# Patient Record
Sex: MALE | Race: BLACK OR AFRICAN AMERICAN | Employment: UNEMPLOYED | URBAN - METROPOLITAN AREA
[De-identification: names, ages, dates, MRNs, and addresses within clinical notes are randomized per-mention and may not be internally consistent; named-entity substitution may affect disease eponyms.]

---

## 2021-10-21 ENCOUNTER — APPOINTMENT (OUTPATIENT)
Dept: GENERAL RADIOLOGY | Age: 41
DRG: 138 | End: 2021-10-21
Attending: FAMILY MEDICINE
Payer: MEDICAID

## 2021-10-21 ENCOUNTER — HOSPITAL ENCOUNTER (INPATIENT)
Age: 41
LOS: 1 days | Discharge: HOME | DRG: 138 | End: 2021-10-21
Attending: FAMILY MEDICINE | Admitting: FAMILY MEDICINE
Payer: MEDICAID

## 2021-10-21 VITALS
HEIGHT: 70 IN | RESPIRATION RATE: 20 BRPM | SYSTOLIC BLOOD PRESSURE: 155 MMHG | DIASTOLIC BLOOD PRESSURE: 98 MMHG | HEART RATE: 102 BPM | OXYGEN SATURATION: 91 % | WEIGHT: 235 LBS | TEMPERATURE: 97.4 F | BODY MASS INDEX: 33.64 KG/M2

## 2021-10-21 PROBLEM — J96.91 RESPIRATORY FAILURE WITH HYPOXIA (HCC): Status: ACTIVE | Noted: 2021-10-21

## 2021-10-21 PROBLEM — J18.9 PNEUMONIA: Status: ACTIVE | Noted: 2021-10-21

## 2021-10-21 LAB
ANION GAP SERPL CALC-SCNC: 7 MMOL/L (ref 5–15)
B PERT DNA SPEC QL NAA+PROBE: NOT DETECTED
BORDETELLA PARAPERTUSSIS PCR, BORPAR: NOT DETECTED
BUN SERPL-MCNC: 22 MG/DL (ref 6–20)
BUN/CREAT SERPL: 19 (ref 12–20)
C PNEUM DNA SPEC QL NAA+PROBE: NOT DETECTED
CALCIUM SERPL-MCNC: 9.5 MG/DL (ref 8.5–10.1)
CHLORIDE SERPL-SCNC: 105 MMOL/L (ref 97–108)
CO2 SERPL-SCNC: 22 MMOL/L (ref 21–32)
CREAT SERPL-MCNC: 1.18 MG/DL (ref 0.7–1.3)
FLUAV H1 2009 PAND RNA SPEC QL NAA+PROBE: NOT DETECTED
FLUAV H1 RNA SPEC QL NAA+PROBE: NOT DETECTED
FLUAV H3 RNA SPEC QL NAA+PROBE: NOT DETECTED
FLUAV SUBTYP SPEC NAA+PROBE: NOT DETECTED
FLUBV RNA SPEC QL NAA+PROBE: NOT DETECTED
GLUCOSE SERPL-MCNC: 123 MG/DL (ref 65–100)
HADV DNA SPEC QL NAA+PROBE: NOT DETECTED
HCOV 229E RNA SPEC QL NAA+PROBE: NOT DETECTED
HCOV HKU1 RNA SPEC QL NAA+PROBE: NOT DETECTED
HCOV NL63 RNA SPEC QL NAA+PROBE: NOT DETECTED
HCOV OC43 RNA SPEC QL NAA+PROBE: NOT DETECTED
HMPV RNA SPEC QL NAA+PROBE: NOT DETECTED
HPIV1 RNA SPEC QL NAA+PROBE: NOT DETECTED
HPIV2 RNA SPEC QL NAA+PROBE: NOT DETECTED
HPIV3 RNA SPEC QL NAA+PROBE: NOT DETECTED
HPIV4 RNA SPEC QL NAA+PROBE: NOT DETECTED
M PNEUMO DNA SPEC QL NAA+PROBE: NOT DETECTED
POTASSIUM SERPL-SCNC: 4.3 MMOL/L (ref 3.5–5.1)
RSV RNA SPEC QL NAA+PROBE: DETECTED
RV+EV RNA SPEC QL NAA+PROBE: DETECTED
SARS-COV-2 PCR, COVPCR: NOT DETECTED
SODIUM SERPL-SCNC: 134 MMOL/L (ref 136–145)

## 2021-10-21 PROCEDURE — 80048 BASIC METABOLIC PNL TOTAL CA: CPT

## 2021-10-21 PROCEDURE — 87536 HIV-1 QUANT&REVRSE TRNSCRPJ: CPT

## 2021-10-21 PROCEDURE — 65270000029 HC RM PRIVATE

## 2021-10-21 PROCEDURE — 86361 T CELL ABSOLUTE COUNT: CPT

## 2021-10-21 PROCEDURE — 99255 IP/OBS CONSLTJ NEW/EST HI 80: CPT | Performed by: INTERNAL MEDICINE

## 2021-10-21 PROCEDURE — 71046 X-RAY EXAM CHEST 2 VIEWS: CPT

## 2021-10-21 PROCEDURE — 36415 COLL VENOUS BLD VENIPUNCTURE: CPT

## 2021-10-21 PROCEDURE — 0202U NFCT DS 22 TRGT SARS-COV-2: CPT

## 2021-10-21 RX ORDER — ACETAMINOPHEN 325 MG/1
650 TABLET ORAL
Status: DISCONTINUED | OUTPATIENT
Start: 2021-10-21 | End: 2021-10-21 | Stop reason: HOSPADM

## 2021-10-21 RX ORDER — ONDANSETRON 4 MG/1
4 TABLET, ORALLY DISINTEGRATING ORAL
Status: DISCONTINUED | OUTPATIENT
Start: 2021-10-21 | End: 2021-10-21 | Stop reason: HOSPADM

## 2021-10-21 RX ORDER — SODIUM CHLORIDE 0.9 % (FLUSH) 0.9 %
5-40 SYRINGE (ML) INJECTION AS NEEDED
Status: DISCONTINUED | OUTPATIENT
Start: 2021-10-21 | End: 2021-10-21 | Stop reason: HOSPADM

## 2021-10-21 RX ORDER — SODIUM CHLORIDE 0.9 % (FLUSH) 0.9 %
5-40 SYRINGE (ML) INJECTION EVERY 8 HOURS
Status: DISCONTINUED | OUTPATIENT
Start: 2021-10-21 | End: 2021-10-21 | Stop reason: HOSPADM

## 2021-10-21 RX ORDER — ACETAMINOPHEN 650 MG/1
650 SUPPOSITORY RECTAL
Status: DISCONTINUED | OUTPATIENT
Start: 2021-10-21 | End: 2021-10-21 | Stop reason: HOSPADM

## 2021-10-21 RX ORDER — ENOXAPARIN SODIUM 100 MG/ML
40 INJECTION SUBCUTANEOUS DAILY
Status: DISCONTINUED | OUTPATIENT
Start: 2021-10-21 | End: 2021-10-21 | Stop reason: HOSPADM

## 2021-10-21 RX ORDER — ONDANSETRON 2 MG/ML
4 INJECTION INTRAMUSCULAR; INTRAVENOUS
Status: DISCONTINUED | OUTPATIENT
Start: 2021-10-21 | End: 2021-10-21 | Stop reason: HOSPADM

## 2021-10-21 RX ORDER — GUAIFENESIN, PSEUDOEPHEDRINE HYDROCHLORIDE 600; 60 MG/1; MG/1
1 TABLET, EXTENDED RELEASE ORAL EVERY 12 HOURS
Qty: 60 TABLET | Refills: 0 | Status: SHIPPED | OUTPATIENT
Start: 2021-10-21 | End: 2021-11-20

## 2021-10-21 NOTE — PROGRESS NOTES
Entered patient room to attempt to redraw earlier ordered blood work  Which couldn't be processed. Patient stated he desires to discharge. Called attending provider who requested pulse oximetry to be obtained. to ensure the patient is safe to discharge.  Advised provider will follow-up shortly

## 2021-10-21 NOTE — H&P
[address] [facility]  History and Physical    Primary Care Provider: None  Date of Service:  10/21/2021    Subjective:     Lakeisha Portillo is a 37 y.o. male with a pmhx HIV, not on antiretrovirals who presented to [facility] for worsening cough, and shortness of breath. He was noted to be hypoxic requiring mid flow oxygen at one point. His XR showed diffuse patchy pneumonia, and his respiratory virus panel was + for rhinovirus, and RSV. He states that he was exposed to his son with RSV recently. He received bactrim for empiric tx of PCP given unkown CD4 count. Patient states that his HIV was treated by Dr. Yomi Curiel formerly, but he was incarcerated several times, and had been lost to follow up. He last took medications one month ago while incarcerated. Review of Systems:    All other review of systems were negative except for that written in the History of Present Illness. PMH: HIV   No past surgical history on file. Prior to Admission medications    Not on File     No Known Allergies   Family hx reviewed, and not pertinent to presenting problem    SOCIAL HISTORY:  [facility]. Patient ambulates independently. Smoking history: 1PPD  Alcohol history: 2-3 25 ounce beers daily        Objective:       Physical Exam:   Visit Vitals  BP (!) 151/88 (BP 1 Location: Right upper arm, BP Patient Position: At rest)   Pulse 84   Temp 98.2 °F (36.8 °C)   Resp 16   Ht 5' 10\" (1.778 m)   Wt 106.6 kg (235 lb)   SpO2 95%   BMI 33.72 kg/m²     General:  Alert, cooperative, no distress, appears stated age. Head:  Normocephalic, without obvious abnormality, atraumatic. Eyes:  Conjunctivae/corneas clear. EOMs intact. Ears:  Normal TMs and external ear canals both ears. Nose: Nares normal. Septum midline. Throat: Lips, mucosa, and tongue normal.    Neck: Supple, symmetrical, trachea midline   Back:   Symmetric, no curvature.  ROM normal. No   Lungs: Clear to auscultation bilaterally. 95% on 4L, breathing comfortably. Chest wall:  No tenderness or deformity. Heart:  Regular rate and rhythm, S1, S2 normal, no murmur, click, rub or gallop. Abdomen:   Soft, non-tender. Bowel sounds normal. No masses,  No organomegaly. Extremities: Extremities normal, atraumatic, no cyanosis or edema. Pulses: 2+  Radial pulses   Skin: Skin color, texture, turgor normal. No rashes or lesions     Data Review: All diagnostic labs and studies have been reviewed. Assessment:     Active Problems:    Pneumonia (10/21/2021)      Respiratory failure with hypoxia ([facility] [address] (10/21/2021)        Plan:     #Hypoxic Respiratory Failure 2/2 pna  #Atypical Pna  -now spo2 95% on 4Lnc, not in distress clinically  -supplemental oxygen prn  -repeat PA/lateral  -upload chest CT (disc/image in chart)>reported negative for PE.  -viral vs PJP vs. covid or other>rapid covid was negative at [address] [place] [address] but will check PCR, and flu (pt. Is not vaccinated), he received bactrim at [facility]    #Rhinovirus, RSV +  -supportive tx    #HIV  -was on atripla, but has not take medication in about one month.   Saw Dr. Wander Carmona previously, but lost to follow up due to several periods of incarceration.  -check CD4, and viral load  -consult ID    Fen: regular  dvt ppx: lovenox  MPOA: mother   Code: Full    FUNCTIONAL STATUS PRIOR TO HOSPITALIZATION Ambulates Independently     Signed By: Rosana Horton MD     October 21, 2021

## 2021-10-21 NOTE — PROGRESS NOTES
Call received from Lab to advise the patient is positive for RSV.  The patient was notified and will continue on Droplet Plus Isolation

## 2021-10-21 NOTE — PROGRESS NOTES
Bedside shift change report given to Skylar Ho RN (oncoming nurse) by Saniya Cardenas (offgoing nurse). Report included the following information SBAR, Kardex, Procedure Summary, Intake/Output, MAR and Recent Results.

## 2021-10-21 NOTE — PROGRESS NOTES
Primary Nurse Baylee Stevenson and Emelina West RN performed a dual skin assessment on this patient No impairment noted  Anjum score is 22

## 2021-10-21 NOTE — PROGRESS NOTES
Pulse oximetry obtained without oxygen. The patient tolerated procedure well. The patient was able to maintain saturation at 91% or greater during ambulation. He had no s/s of distress. Attending provider notified who advised she will review chart to determine f patient can be discharged. Also referred to CM for ride home once discharged.

## 2021-10-21 NOTE — PROGRESS NOTES
JANAE: Plan for discharge home today. CM arranged lyft transport via [employer]; jagruti is going to [facility] [address], [address]. Chart reviewed. Patient discussed in rounds. CM will coordinate follow-up with [facility] ID, patient has seen Dr. Gaudencio Garcia in the past.    CM called [facility] ([phone] ID clinic and left message to request appointment. Awaiting response. CM did not receive call back from [facility] to schedule follow-up.      Paris Hernandez, BSW/CRM
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
## 2021-10-21 NOTE — PROGRESS NOTES
Patient discharging to home. At the time of discharge, the patient had no complaints of pain nor any s/s of distress. discharge instructions provided. All personal belongings removed with patient.

## 2021-10-22 LAB
BASOPHILS # BLD AUTO: 0 X10E3/UL (ref 0–0.2)
BASOPHILS NFR BLD AUTO: 1 %
CD3+CD4+ CELLS # BLD: 573 /UL (ref 359–1519)
CD3+CD4+ CELLS NFR BLD: 35.8 % (ref 30.8–58.5)
EOSINOPHIL # BLD AUTO: 0.1 X10E3/UL (ref 0–0.4)
EOSINOPHIL NFR BLD AUTO: 2 %
ERYTHROCYTE [DISTWIDTH] IN BLOOD BY AUTOMATED COUNT: 14.6 % (ref 11.6–15.4)
HCT VFR BLD AUTO: 44.2 % (ref 37.5–51)
HGB BLD-MCNC: 15.2 G/DL (ref 13–17.7)
HIV1 RNA # SERPL NAA+PROBE: 1230 COPIES/ML
HIV1 RNA SERPL NAA+PROBE-LOG#: 3.09 LOG10COPY/ML
IMM GRANULOCYTES # BLD: 0.1 X10E3/UL (ref 0–0.1)
IMM GRANULOCYTES NFR BLD: 1 %
LYMPHOCYTES # BLD AUTO: 1.6 X10E3/UL (ref 0.7–3.1)
LYMPHOCYTES NFR BLD AUTO: 24 %
MCH RBC QN AUTO: 30 PG (ref 26.6–33)
MCHC RBC AUTO-ENTMCNC: 34.4 G/DL (ref 31.5–35.7)
MCV RBC AUTO: 87 FL (ref 79–97)
MONOCYTES # BLD AUTO: 0.7 X10E3/UL (ref 0.1–0.9)
MONOCYTES NFR BLD AUTO: 10 %
NEUTROPHILS # BLD AUTO: 4.4 X10E3/UL (ref 1.4–7)
NEUTROPHILS NFR BLD AUTO: 62 %
PLATELET # BLD AUTO: 217 X10E3/UL (ref 150–450)
RBC # BLD AUTO: 5.07 X10E6/UL (ref 4.14–5.8)
WBC # BLD AUTO: 6.9 X10E3/UL (ref 3.4–10.8)

## 2021-10-22 NOTE — PROGRESS NOTES
Physician Progress Note      PATIENT:               Lorna Cohn  CSN #:                  [id number]  :                       1980  ADMIT DATE:       10/21/2021 1:37 AM  DISCH DATE:        10/21/2021 5:34 PM  RESPONDING  PROVIDER #:        Diane Sanchez MD          QUERY TEXT:    Patient admitted with cough and shortness of breath. Noted documentation of acute respiratory failure in H&P 10/21 and Discharge Summary 10/21. The two sets of vital signs documented in the record show RR 16 95% 10/21 025 and  RR 20 91% RA 1426. Patient documented to need 4L O2 on admission, weaned to 2L and discharged to room air. H&P documents that the patient is not in distress clinically. In order to support the diagnosis of acute respiratory failure, please include additional clinical indicators in your documentation. Or please document if the diagnosis of acute respiratory failure has been ruled out after further study. The medical record reflects the following:  Risk Factors: 40M positive for REV RSV reports cough and wheezing  Clinical Indicators: All Documented Vital Signs for this Encounter  10/21 0259 RR 16 95% not documented as to RA or O2  10/21 1426 RR 20 91% RA    10/21 0548 Finn Rivera MD H&P  He was noted to be hypoxic requiring mid flow oxygen at one point. His XR showed diffuse patchy pneumonia, and his respiratory virus panel was + for rhinovirus, and RSV    10/21 1440 Sandy Bae MD Discharge Summary  Patient was admitted and quickly weaned to [facility], then to . Pt was seen by ID, recommended supportive care for viral pneumonia. Pt's CD4 count and VL are unknown. Pt wanted to be discharged, and stable on RA with saturations 91%  ? DISCHARGE DIAGNOSES / PLAN:  ?  ? Hypoxic Respiratory Failure 2/2 pna - resolved. Atypical Pna  weaned to RA, required 4LNC on admit  upload chest CT (disc/image in chart)>reported negative for PE.   Respiratory panel positive for REV+, RSV+  No need for antibiotics  Albuterol for home for symptoms  ? Rhinovirus, RSV +  supportive tx  ? Treatment: supplemental O2 prn    Acute Respiratory Failure Clinical Indicators per [employer] Training Guide and Quick Reference Guide:  pO2 < 60 mmHg or SpO2 (pulse oximetry) < 91% breathing room air  pCO2 > 50 and pH < 7.35  P/F ratio (pO2 / FIO2) < 300  pO2 decrease or pCO2 increase by 10 mmHg from baseline (if known)  Supplemental oxygen of 40% or more  Presence of respiratory distress, tachypnea, dyspnea, shortness of breath, wheezing  Unable to speak in complete sentences  Use of accessory muscles to breathe  Extreme anxiety and feeling of impending doom  Tripod position  Confusion/altered mental status/obtunded    Thank you    [address] Specialist  [phone]  Options provided:  -- Acute Respiratory Failure as evidenced by, Please document evidence. -- Acute Respiratory Failure ruled out after study  -- Other - I will add my own diagnosis  -- Disagree - Not applicable / Not valid  -- Disagree - Clinically unable to determine / Unknown  -- Refer to Clinical Documentation Reviewer    PROVIDER RESPONSE TEXT:    This patient is in acute respiratory failure as evidenced by requiring 4Lnc on admission    Query created by: Nai Aguiar on 10/22/2021 3:10 PM      QUERY TEXT:    Pt admitted with shortness of breath and cough. Pt noted to have atypical pneumonia documented. Patient positive for rhinovirus and RSV. No antibiotics given. If possible, please document in the progress notes and discharge summary if you are evaluating and/or treating any of the following:     The medical record reflects the following:  Risk Factors: 40M exposure to RSV testing positive for RSV and Rhinovirus on PCR  Clinical Indicators:    10/21 Respiratory Viral PCR  Rhinovirus and Enterovirus NOTD   Detected  RSV by PCR NOTD   Detected    10/21 CXR  IMPRESSION  No acute cardiopulmonary disease. 10/21 Santos Carmona MD Discharge Summary  Hypoxic Respiratory Failure 2/2 pna - resolved. Atypical Pna  -weaned to RA, required 4LNC on admit  -upload chest CT (disc/image in chart)>reported negative for PE.  -Respiratory panel positive for REV+, RSV+  - No need for antibiotics  - Albuterol for home for symptoms  ? Rhinovirus, RSV +  -supportive tx    Treatment: As documented in the above quoted documentation    Thank you    Arash NDIAYEN RN [facility] [place], operated by [facility]  Clinical Documentation Improvement Specialist  [phone]  Options provided:  -- Viral Pneumonia unspecified  -- Viral Pneumonia due to RSV and or rhinovirus  -- Other - I will add my own diagnosis  -- Disagree - Not applicable / Not valid  -- Disagree - Clinically unable to determine / Unknown  -- Refer to Clinical Documentation Reviewer    PROVIDER RESPONSE TEXT:    This patient has viral Pneumonia due to RSV and or rhinovirus.     Query created by: Gloria Marquez on 10/22/2021 3:17 PM      Electronically signed by:  Steven Kidd MD 10/22/2021 4:28 PM

## 2022-03-19 PROBLEM — J18.9 PNEUMONIA: Status: ACTIVE | Noted: 2021-10-21

## 2022-03-20 PROBLEM — J96.91 RESPIRATORY FAILURE WITH HYPOXIA (HCC): Status: ACTIVE | Noted: 2021-10-21

## 2022-12-12 NOTE — DISCHARGE SUMMARY
[facility] Note    Subjective     Chief Complaint   Patient presents with   • Right Knee - Pain        HPI    Loreta Robertson is a 55 y.o. female who presents with new problem of: right hip and knee pain.  Onset: atraumatic and gradual in nature. The issue has been ongoing for 6 month(s). Pain is a 9/10 on the pain scale. Pain is described as stabbing. Associated symptoms include pain, stiffness and giving way/buckling. The pain is worse with walking, standing, climbing stairs, sleeping, working, leisure, lying on affected side, rising from seated position and any movement of the joint; resting, sitting, assistive device (cane/walker) and lying down improve the pain. Previous treatments have included: NSAIDS.  Pain has become worse in the hip over the past 2 weeks in particular.    I have reviewed the following portions of the patient's history and agree with: History of Present Illness and Review of Systems    Patient Active Problem List   Diagnosis   • Tobacco use   • Bipolar 1 disorder, manic, mild (HCC)   • Morbid obesity with BMI of 50.0-59.9, adult (HCC)   • Other hyperlipidemia   • HTN (hypertension)   • Chronic obstructive pulmonary disease (HCC)   • Impaired glucose tolerance   • Chronic right shoulder pain     Past Medical History:   Diagnosis Date   • Anxiety    • Bipolar 1 disorder (HCC)    • Chronic bronchitis (HCC)    • Chronic constipation    • Depression    • Hyperlipidemia    • Parkinson's disease (HCC)    • Primary generalized (osteo)arthritis    • PTSD (post-traumatic stress disorder)    • RLS (restless legs syndrome)       Past Surgical History:   Procedure Laterality Date   • ABDOMINOPLASTY     • ANTERIOR AND POSTERIOR VAGINAL REPAIR      PATIENT REPORTS ANTERIOR AND POSTERIOR REPAIRS OF UTERUS AND BOTTOM   • BREAST BIOPSY Right     US BIOPSY   • BREAST BIOPSY Right    •  SECTION      X 2   • CHOLECYSTECTOMY     • COLONOSCOPY     • COLONOSCOPY N/A 2019     Discharge Summary       PATIENT ID: Iván Abreu  MRN: [id number]   YOB: 1980    DATE OF ADMISSION: 10/21/2021  1:37 AM    DATE OF DISCHARGE: 10/21/2021   PRIMARY CARE PROVIDER: None     DISCHARGING PROVIDER: Jessie Zuniga MD    To contact this individual call [phone] and ask the  to page. If unavailable ask to be transferred the Adult Hospitalist Department. CONSULTATIONS: IP CONSULT TO INFECTIOUS DISEASES    PROCEDURES/SURGERIES: * No surgery found *    ADMITTING DIAGNOSES & HOSPITAL COURSE:   RSV  REV+  HIV     Iván Abreu is a 37 y.o. male with a pmhx HIV, not on antiretrovirals who presented to [facility] for worsening cough, and shortness of breath. He was noted to be hypoxic requiring mid flow oxygen at one point. His XR showed diffuse patchy pneumonia, and his respiratory virus panel was + for rhinovirus, and RSV. He states that he was exposed to his son with RSV recently. Patient was admitted and quickly weaned to [facility], then to . Pt was seen by ID, recommended supportive care for viral pneumonia. Pt's CD4 count and VL are unknown. Pt wanted to be discharged, and stable on RA with saturations 91%    DISCHARGE DIAGNOSES / PLAN:         Hypoxic Respiratory Failure 2/2 pna - resolved. Atypical Pna  -weaned to RA, required 4LNC on admit   -upload chest CT (disc/image in chart)>reported negative for PE.  -Respiratory panel positive for REV+, RSV+  - No need for antibiotics  - Albuterol for home for symptoms      Rhinovirus, RSV +  -supportive tx     HIV  -was on atripla, but has not take medication in about one month. Saw Dr. Lavenia Pallas previously, but lost to follow up due to several periods of incarceration.  -check CD4, and viral load pending   -ID following  - Will need o/p ID follow up with Dr. Monica Armendariz:   - Please take all medications as prescribed. Note changes as below.    **Add albuterol PRN   - Please make Procedure: COLONOSCOPY;  Surgeon: Jonathan Pablo MD;  Location: [facility] ENDOSCOPY;  Service: Gastroenterology   • HYSTERECTOMY      AGE 36   • LIPOSUCTION ABDOMINAL  1998      Family History   Problem Relation Age of Onset   • Breast cancer Mother 50   • Diabetes Mother    • Cancer Mother    • Diabetes Father    • Hypertension Father    • Heart attack Father    • Obesity Father    • Breast cancer Maternal Grandmother 40   • Ovarian cancer Maternal Grandmother 40   • Cervical cancer Maternal Grandmother      Social History     Socioeconomic History   • Marital status: Single   Tobacco Use   • Smoking status: Every Day     Packs/day: 1.00     Years: 39.00     Pack years: 39.00     Types: Cigarettes     Start date: 1980   • Smokeless tobacco: Never   Vaping Use   • Vaping Use: Never used   Substance and Sexual Activity   • Alcohol use: No   • Drug use: No   • Sexual activity: Defer      Current Outpatient Medications on File Prior to Visit   Medication Sig Dispense Refill   • albuterol sulfate  (90 Base) MCG/ACT inhaler Inhale 2 puffs Every 4 (Four) Hours As Needed for Wheezing. 18 g 2   • atorvastatin (LIPITOR) 40 MG tablet      • azithromycin (ZITHROMAX) 250 MG tablet Z-Ruy as directed 6 tablet 0   • buPROPion XL (WELLBUTRIN XL) 150 MG 24 hr tablet Take 150 mg by mouth Every Morning.     • divalproex (DEPAKOTE ER) 500 MG 24 hr tablet Take 500 mg by mouth Every Night.     • hydrOXYzine (ATARAX) 10 MG tablet Take 10 mg by mouth At Night As Needed.     • ibuprofen (ADVIL,MOTRIN) 600 MG tablet Take 1 tablet by mouth Every 8 (Eight) Hours As Needed for Mild Pain or Moderate Pain. 30 tablet 0   • nitrofurantoin, macrocrystal-monohydrate, (Macrobid) 100 MG capsule Take 1 capsule by mouth 2 (Two) Times a Day. 14 capsule 0   • prazosin (MINIPRESS) 5 MG capsule Take 5 mg by mouth Every Night.  1   • predniSONE (DELTASONE) 10 MG tablet 6-day taper:6,5,4,3,2,1 21 tablet 0   • primidone (MYSOLINE) 50 MG tablet Take 50  sure to follow up with your primary care physician within 1-2 weeks of discharge for hospital follow up. You should also follow up with Dr. Reid Carpio or another ID provider.   - Please make sure to continue to monitor for: Chest pain, shortness of breath, high fevers, and return to the Emergency Department with any of these symptoms.   - Please get up slowly from a seated or laying position, avoid falls. - Avoid tobacco, alcohol and other illicit drug use. - PLEASE get your covid shot as soon as you feel up to it. - Diet restrictions: None  - Activity restrictions: None,   - Wound care: None          PENDING TEST RESULTS:   At the time of discharge the following test results are still pending:  None    FOLLOW UP APPOINTMENTS:    Follow-up Information     Follow up With Specialties Details Why Contact Info    None    None ([phone]) Patient stated that they have no PCP               DIET: Regular Diet    ACTIVITY: Activity as tolerated    WOUND CARE: None    EQUIPMENT needed: None      DISCHARGE MEDICATIONS:  Current Discharge Medication List      START taking these medications    Details   PSEUDOEPHEDRINE-guaiFENesin (Mucinex D)  mg per tablet Take 1 Tablet by mouth every twelve (12) hours for 30 days. Qty: 60 Tablet, Refills: 0  Start date: 10/21/2021, End date: 11/20/2021      albuterol sulfate 90 mcg/actuation aebs Take 2 Puffs by inhalation every four (4) hours as needed for Wheezing for up to 30 days. Qty: 30 Each, Refills: 0  Start date: 10/21/2021, End date: 11/20/2021               NOTIFY YOUR PHYSICIAN FOR ANY OF THE FOLLOWING:   Fever over 101 degrees for 24 hours. Chest pain, shortness of breath, fever, chills, nausea, vomiting, diarrhea, change in mentation, falling, weakness, bleeding. Severe pain or pain not relieved by medications. Or, any other signs or symptoms that you may have questions about.     DISPOSITION:   X Home With:   OT  PT  JAY  RN       Long term SNF/Inpatient Rehab Independent/assisted living    Hospice    Other:       PATIENT CONDITION AT DISCHARGE:     Functional status    Poor     Deconditioned    X Independent      Cognition    X Lucid     Forgetful     Dementia      Catheters/lines (plus indication)    Espino     PICC     PEG    X None      Code status    X Full code     DNR      PHYSICAL EXAMINATION AT DISCHARGE:  Visit Vitals  BP (!) 155/98 (BP 1 Location: Right upper arm, BP Patient Position: Sitting)   Pulse (!) 102   Temp 97.4 °F (36.3 °C)   Resp 20   Ht 5' 10\" (1.778 m)   Wt 106.6 kg (235 lb)   SpO2 91%   BMI 33.72 kg/m²     Gen: NAD, awake in bed  HEENT: NC/AT, sclera anicteric, PERRL, EOMI  CV: RRR no m/r/g, normal S1 and S2, no pedal edema   Resp: CTA b/l no increased work of breathing, no wheezing or rhonchi, speaking in full sentences   Abd: NT/ND, normal bowel sounds, no rebound or guarding  Ext: 2+ pulses, no edema  Skin: No rashes or lesions      CHRONIC MEDICAL DIAGNOSES:  Problem List as of 10/21/2021 Never Reviewed        Codes Class Noted - Resolved    Pneumonia ICD-10-CM: J18.9  ICD-9-CM: 486  10/21/2021 - Present        Respiratory failure with hypoxia [facility]) ICD-10-CM: J96.91  ICD-9-CM: 518.81  10/21/2021 - Present              Greater than 30 minutes were spent with the patient on counseling and coordination of care    Signed:   Jonathan Thakkar MD  10/21/2021  2:40 PM mg by mouth 2 (Two) Times a Day.     • promethazine-dextromethorphan (PROMETHAZINE-DM) 6.25-15 MG/5ML syrup 1 to 2 teaspoons 3 times daily as needed cough congestion. 120 mL 0   • QUEtiapine (SEROquel) 200 MG tablet Take 200 mg by mouth every night at bedtime.     • tiZANidine (ZANAFLEX) 4 MG tablet TAKE 1 TABLET BY MOUTH EVERY 8 HOURS AS NEEDED FOR MUSCLE SPASMS 60 tablet 0   • vitamin D (ERGOCALCIFEROL) 1.25 MG (56625 UT) capsule capsule Take 1 capsule by mouth Every 7 (Seven) Days. 5 capsule 2   • [DISCONTINUED] budesonide-formoterol (Symbicort) 160-4.5 MCG/ACT inhaler Inhale 2 puffs 2 (Two) Times a Day. 10.2 g 0     No current facility-administered medications on file prior to visit.      Allergies   Allergen Reactions   • Morphine And Related GI Intolerance        Review of Systems   Constitutional: Negative for activity change, appetite change, chills, diaphoresis, fatigue, fever and unexpected weight change.   HENT: Negative for congestion, dental problem, drooling, ear discharge, ear pain, facial swelling, hearing loss, mouth sores, nosebleeds, postnasal drip, rhinorrhea, sinus pressure, sneezing, sore throat, tinnitus, trouble swallowing and voice change.    Eyes: Negative for photophobia, pain, discharge, redness, itching and visual disturbance.   Respiratory: Negative for apnea, cough, choking, chest tightness, shortness of breath, wheezing and stridor.    Cardiovascular: Negative for chest pain, palpitations and leg swelling.   Gastrointestinal: Negative for abdominal distention, abdominal pain, anal bleeding, blood in stool, constipation, diarrhea, nausea, rectal pain and vomiting.   Endocrine: Negative for cold intolerance, heat intolerance, polydipsia, polyphagia and polyuria.   Genitourinary: Negative for decreased urine volume, difficulty urinating, dysuria, enuresis, flank pain, frequency, genital sores, hematuria and urgency.   Musculoskeletal: Positive for arthralgias. Negative for back pain,  "gait problem, joint swelling, myalgias, neck pain and neck stiffness.   Skin: Negative for color change, pallor, rash and wound.   Allergic/Immunologic: Negative for environmental allergies, food allergies and immunocompromised state.   Neurological: Negative for dizziness, tremors, seizures, syncope, facial asymmetry, speech difficulty, weakness, light-headedness, numbness and headaches.   Hematological: Negative for adenopathy. Does not bruise/bleed easily.   Psychiatric/Behavioral: Negative for agitation, behavioral problems, confusion, decreased concentration, dysphoric mood, hallucinations, self-injury, sleep disturbance and suicidal ideas. The patient is not nervous/anxious and is not hyperactive.         Objective      Physical Exam  /90   Ht 167.6 cm (65.98\")   Wt 128 kg (283 lb)   BMI 45.70 kg/m²     Body mass index is 45.7 kg/m².    General:   Mental Status:  Alert   Appearance: Cooperative, in no acute distress   Build and Nutrition: Obese by BMI female   Orientation: Alert and oriented to person, place and time   Posture: Normal   Gait: Limping on the right    Integument:   Right knee: No skin lesions, no rash, no ecchymosis    Neurologic:   Sensation:    Right foot: Intact to light touch on the dorsal and plantar aspect   Motor:  Right lower extremity: 5/5 quadriceps, hamstrings, ankle dorsiflexors, and ankle plantar flexors    Vascular:   Right lower extremity: 2+ dorsalis pedis pulse, prompt capillary refill    Lower Extremities:   Right Knee:    Tenderness:  None    Effusion:  None    Swelling:  None    Crepitus:  Positive    Atrophy:  None    Range of motion:  Extension: 5°       Flexion: 100°  Instability:  No varus laxity, no valgus laxity, negative anterior drawer  Deformities:  Varus  Positive Stinchfield on the right, and pain with internal rotation of the hip in the groin      Imaging/Studies      Imaging Results (Last 24 Hours)     Procedure Component Value Units Date/Time    XR Hip " With or Without Pelvis 2 - 3 View Right [[id number]] Resulted: 12/12/22 1544     Updated: 12/12/22 1545    Narrative:      Right Hip Radiographs  Indication: right hip pain  Views: low AP pelvis and lateral of the right hip    Comparison: 8/8/2022    Findings:   No acute bony abnormalities.  Joint space maintained.  No unusual bony   features.  Questionable sclerosis in the femoral head.    XR Knee 4+ View Right [[id number]] Resulted: 12/12/22 1514     Updated: 12/12/22 1515    Narrative:      Right Knee Radiographs  Indication: right knee pain  Views: Standing AP's and skiers of both knees, with lateral and sunrise   views of the right knee    Comparison: Nonweightbearing imaging from 1/27/2000    Findings:   Bone-on-bone contact medial compartment, tricompartmental degeneration,   varus alignment, no acute bony abnormalities.  No unusual bony features.    No significant change compared to the previous imaging.          Assessment and Plan     Diagnoses and all orders for this visit:    1. Chronic right hip pain (Primary)  -     XR Hip With or Without Pelvis 2 - 3 View Right  -     MRI Hip Right Without Contrast; Future    2. Primary osteoarthritis of right knee  -     XR Knee 4+ View Right    Other orders  -     diazePAM (Valium) 5 MG tablet; Take 1 tablet by mouth Once for 1 dose.  Dispense: 1 tablet; Refill: 0        1. Chronic right hip pain    2. Primary osteoarthritis of right knee        I reviewed my findings with the patient.  Pain generator seems to be the hip on examination in spite of advanced arthritic changes in the right knee.  I am suspicious of possible avascular necrosis, and at this point I recommended an MRI of her hip.  I will see her back after an MRI of her right hip, but I will be happy to see her back sooner for any problems.    Addendum: The patient tells me that she has significant claustrophobia, and we will prescribe Valium for her MRI.    Return for After Imaging Study.      Adrián DAVIS  MD Gordon  12/12/22  21:01 EST

## 2024-01-20 ENCOUNTER — APPOINTMENT (OUTPATIENT)
Facility: HOSPITAL | Age: 44
End: 2024-01-20
Payer: COMMERCIAL

## 2024-01-20 ENCOUNTER — HOSPITAL ENCOUNTER (EMERGENCY)
Facility: HOSPITAL | Age: 44
Discharge: HOME | End: 2024-01-21
Attending: STUDENT IN AN ORGANIZED HEALTH CARE EDUCATION/TRAINING PROGRAM
Payer: COMMERCIAL

## 2024-01-20 VITALS
HEIGHT: 70 IN | HEART RATE: 91 BPM | WEIGHT: 225 LBS | SYSTOLIC BLOOD PRESSURE: 137 MMHG | RESPIRATION RATE: 29 BRPM | BODY MASS INDEX: 32.21 KG/M2 | TEMPERATURE: 98.1 F | DIASTOLIC BLOOD PRESSURE: 90 MMHG | OXYGEN SATURATION: 96 %

## 2024-01-20 DIAGNOSIS — R07.9 CHEST PAIN, UNSPECIFIED TYPE: Primary | ICD-10-CM

## 2024-01-20 LAB
ALBUMIN SERPL-MCNC: 3.6 G/DL (ref 3.5–5)
ALBUMIN/GLOB SERPL: 0.8 (ref 1.1–2.2)
ALP SERPL-CCNC: 69 U/L (ref 45–117)
ALT SERPL-CCNC: 36 U/L (ref 12–78)
ANION GAP SERPL CALC-SCNC: 2 MMOL/L (ref 5–15)
AST SERPL-CCNC: 18 U/L (ref 15–37)
BASOPHILS # BLD: 0.1 K/UL (ref 0–0.1)
BASOPHILS NFR BLD: 1 % (ref 0–1)
BILIRUB SERPL-MCNC: 0.4 MG/DL (ref 0.2–1)
BUN SERPL-MCNC: 22 MG/DL (ref 6–20)
BUN/CREAT SERPL: 18 (ref 12–20)
CALCIUM SERPL-MCNC: 9.4 MG/DL (ref 8.5–10.1)
CHLORIDE SERPL-SCNC: 108 MMOL/L (ref 97–108)
CO2 SERPL-SCNC: 29 MMOL/L (ref 21–32)
COMMENT:: NORMAL
CREAT SERPL-MCNC: 1.19 MG/DL (ref 0.7–1.3)
DIFFERENTIAL METHOD BLD: ABNORMAL
EOSINOPHIL # BLD: 0.2 K/UL (ref 0–0.4)
EOSINOPHIL NFR BLD: 3 % (ref 0–7)
ERYTHROCYTE [DISTWIDTH] IN BLOOD BY AUTOMATED COUNT: 14.1 % (ref 11.5–14.5)
GLOBULIN SER CALC-MCNC: 4.3 G/DL (ref 2–4)
GLUCOSE SERPL-MCNC: 124 MG/DL (ref 65–100)
HCT VFR BLD AUTO: 38.6 % (ref 36.6–50.3)
HGB BLD-MCNC: 13.1 G/DL (ref 12.1–17)
IMM GRANULOCYTES # BLD AUTO: 0 K/UL (ref 0–0.04)
IMM GRANULOCYTES NFR BLD AUTO: 0 % (ref 0–0.5)
LIPASE SERPL-CCNC: 39 U/L (ref 13–75)
LYMPHOCYTES # BLD: 3.8 K/UL (ref 0.8–3.5)
LYMPHOCYTES NFR BLD: 54 % (ref 12–49)
MAGNESIUM SERPL-MCNC: 1.9 MG/DL (ref 1.6–2.4)
MCH RBC QN AUTO: 30 PG (ref 26–34)
MCHC RBC AUTO-ENTMCNC: 33.9 G/DL (ref 30–36.5)
MCV RBC AUTO: 88.3 FL (ref 80–99)
MONOCYTES # BLD: 0.5 K/UL (ref 0–1)
MONOCYTES NFR BLD: 7 % (ref 5–13)
NEUTS SEG # BLD: 2.4 K/UL (ref 1.8–8)
NEUTS SEG NFR BLD: 35 % (ref 32–75)
NRBC # BLD: 0 K/UL (ref 0–0.01)
NRBC BLD-RTO: 0 PER 100 WBC
PLATELET # BLD AUTO: 207 K/UL (ref 150–400)
PMV BLD AUTO: 10.6 FL (ref 8.9–12.9)
POTASSIUM SERPL-SCNC: 3.7 MMOL/L (ref 3.5–5.1)
PROT SERPL-MCNC: 7.9 G/DL (ref 6.4–8.2)
RBC # BLD AUTO: 4.37 M/UL (ref 4.1–5.7)
SODIUM SERPL-SCNC: 139 MMOL/L (ref 136–145)
SPECIMEN HOLD: NORMAL
TROPONIN I SERPL HS-MCNC: 5 NG/L (ref 0–76)
WBC # BLD AUTO: 6.9 K/UL (ref 4.1–11.1)

## 2024-01-20 PROCEDURE — 71045 X-RAY EXAM CHEST 1 VIEW: CPT

## 2024-01-20 PROCEDURE — 85025 COMPLETE CBC W/AUTO DIFF WBC: CPT

## 2024-01-20 PROCEDURE — 84484 ASSAY OF TROPONIN QUANT: CPT

## 2024-01-20 PROCEDURE — 99285 EMERGENCY DEPT VISIT HI MDM: CPT

## 2024-01-20 PROCEDURE — 80053 COMPREHEN METABOLIC PANEL: CPT

## 2024-01-20 PROCEDURE — 36415 COLL VENOUS BLD VENIPUNCTURE: CPT

## 2024-01-20 PROCEDURE — 83690 ASSAY OF LIPASE: CPT

## 2024-01-20 PROCEDURE — 83735 ASSAY OF MAGNESIUM: CPT

## 2024-01-21 LAB
EKG ATRIAL RATE: 88 BPM
EKG DIAGNOSIS: NORMAL
EKG P AXIS: 47 DEGREES
EKG P-R INTERVAL: 174 MS
EKG Q-T INTERVAL: 360 MS
EKG QRS DURATION: 88 MS
EKG QTC CALCULATION (BAZETT): 435 MS
EKG R AXIS: 25 DEGREES
EKG T AXIS: 32 DEGREES
EKG VENTRICULAR RATE: 88 BPM
TROPONIN I SERPL HS-MCNC: 5 NG/L (ref 0–76)

## 2024-01-21 RX ORDER — SUCRALFATE 1 G/1
1 TABLET ORAL 4 TIMES DAILY
Qty: 120 TABLET | Refills: 0 | Status: SHIPPED | OUTPATIENT
Start: 2024-01-21 | End: 2024-02-20

## 2024-01-21 RX ORDER — OMEPRAZOLE 20 MG/1
40 CAPSULE, DELAYED RELEASE ORAL DAILY
Qty: 60 CAPSULE | Refills: 0 | Status: SHIPPED | OUTPATIENT
Start: 2024-01-21 | End: 2024-02-20

## 2024-01-25 NOTE — ED PROVIDER NOTES
9:37 PM   Result Value Ref Range    Magnesium 1.9 1.6 - 2.4 mg/dL   Troponin    Collection Time: 01/20/24 11:18 PM   Result Value Ref Range    Troponin, High Sensitivity 5 0 - 76 ng/L        RADIOLOGY:  Non-plain film images such as CT, Ultrasound and MRI are read by the radiologist. Plain radiographic images are visualized and preliminarily interpreted by the ED Provider with the below findings:        Interpretation per the Radiologist below, if available at the time of this note:     XR CHEST PORTABLE   Final Result      No acute process on portable chest.                    EMERGENCY DEPARTMENT COURSE and DIFFERENTIAL DIAGNOSIS/MDM   Vitals:    Vitals:    01/20/24 2130 01/20/24 2145 01/20/24 2200 01/20/24 2215   BP: 136/85 138/89 129/86 (!) 137/90   Pulse: 100 88 90 91   Resp: 28  22 29   Temp:  98.1 °F (36.7 °C)     TempSrc:  Oral     SpO2: 98% 98% 96% 96%   Weight:       Height:                Patient was given the following medications:  Medications - No data to display    CONSULTS: (Who and What was discussed)  None      Chronic Conditions: Reflux    Social Determinants affecting Dx or Tx: None    Records Reviewed (source and summary of external notes): Nursing Notes    CC/HPI Summary, DDx, ED Course, and Reassessment:   Mdm  43-year-old male presents for evaluation of atypical chest pain.  He has no pain at this time.  And the pain has not returned.  Nontoxic, reassuring physical exam, no pitting edema, clear breath sounds throughout, no history of DVT or PE.  EKG non-STEMI, no arrhythmia, will obtain blood work, chest x-ray.  Because the pain started and resolved just prior to arrival, we will obtain 2 troponins while he is here in the department.  Differential includes ACS, pneumothorax, pneumonia, anemia, arrhythmia, electrolyte disturbance, chest wall pain, pleurisy.  Less likely but still consider pulmonary embolism, no hypoxia, no tachycardia, no pain at this time, no other risk factors.

## 2024-01-26 ENCOUNTER — APPOINTMENT (OUTPATIENT)
Facility: HOSPITAL | Age: 44
DRG: 203 | End: 2024-01-26
Payer: COMMERCIAL

## 2024-01-26 ENCOUNTER — HOSPITAL ENCOUNTER (INPATIENT)
Facility: HOSPITAL | Age: 44
LOS: 1 days | Discharge: HOME | DRG: 203 | End: 2024-01-26
Attending: STUDENT IN AN ORGANIZED HEALTH CARE EDUCATION/TRAINING PROGRAM | Admitting: STUDENT IN AN ORGANIZED HEALTH CARE EDUCATION/TRAINING PROGRAM
Payer: COMMERCIAL

## 2024-01-26 VITALS
BODY MASS INDEX: 34.36 KG/M2 | SYSTOLIC BLOOD PRESSURE: 112 MMHG | DIASTOLIC BLOOD PRESSURE: 77 MMHG | WEIGHT: 240 LBS | HEART RATE: 70 BPM | HEIGHT: 70 IN | TEMPERATURE: 98.4 F | OXYGEN SATURATION: 97 % | RESPIRATION RATE: 20 BRPM

## 2024-01-26 DIAGNOSIS — R07.9 CHEST PAIN, UNSPECIFIED TYPE: Primary | ICD-10-CM

## 2024-01-26 LAB
ALBUMIN SERPL-MCNC: 4.1 G/DL (ref 3.5–5)
ALBUMIN/GLOB SERPL: 1 (ref 1.1–2.2)
ALP SERPL-CCNC: 78 U/L (ref 45–117)
ALT SERPL-CCNC: 34 U/L (ref 12–78)
ANION GAP SERPL CALC-SCNC: 1 MMOL/L (ref 5–15)
AST SERPL-CCNC: 18 U/L (ref 15–37)
BASOPHILS # BLD: 0.1 K/UL (ref 0–0.1)
BASOPHILS NFR BLD: 1 % (ref 0–1)
BILIRUB SERPL-MCNC: 0.5 MG/DL (ref 0.2–1)
BUN SERPL-MCNC: 17 MG/DL (ref 6–20)
BUN/CREAT SERPL: 17 (ref 12–20)
CALCIUM SERPL-MCNC: 10.2 MG/DL (ref 8.5–10.1)
CHLORIDE SERPL-SCNC: 107 MMOL/L (ref 97–108)
CO2 SERPL-SCNC: 32 MMOL/L (ref 21–32)
CREAT SERPL-MCNC: 1.02 MG/DL (ref 0.7–1.3)
D DIMER PPP FEU-MCNC: 0.31 MG/L FEU (ref 0–0.65)
DIFFERENTIAL METHOD BLD: NORMAL
EKG ATRIAL RATE: 71 BPM
EKG DIAGNOSIS: NORMAL
EKG P AXIS: 47 DEGREES
EKG P-R INTERVAL: 156 MS
EKG Q-T INTERVAL: 360 MS
EKG QRS DURATION: 92 MS
EKG QTC CALCULATION (BAZETT): 391 MS
EKG R AXIS: 48 DEGREES
EKG T AXIS: 8 DEGREES
EKG VENTRICULAR RATE: 71 BPM
EOSINOPHIL # BLD: 0.1 K/UL (ref 0–0.4)
EOSINOPHIL NFR BLD: 2 % (ref 0–7)
ERYTHROCYTE [DISTWIDTH] IN BLOOD BY AUTOMATED COUNT: 14.2 % (ref 11.5–14.5)
GLOBULIN SER CALC-MCNC: 4.2 G/DL (ref 2–4)
GLUCOSE SERPL-MCNC: 94 MG/DL (ref 65–100)
HCT VFR BLD AUTO: 42.9 % (ref 36.6–50.3)
HGB BLD-MCNC: 14.4 G/DL (ref 12.1–17)
IMM GRANULOCYTES # BLD AUTO: 0 K/UL (ref 0–0.04)
IMM GRANULOCYTES NFR BLD AUTO: 0 % (ref 0–0.5)
LYMPHOCYTES # BLD: 2.1 K/UL (ref 0.8–3.5)
LYMPHOCYTES NFR BLD: 29 % (ref 12–49)
MCH RBC QN AUTO: 29.9 PG (ref 26–34)
MCHC RBC AUTO-ENTMCNC: 33.6 G/DL (ref 30–36.5)
MCV RBC AUTO: 89.2 FL (ref 80–99)
MONOCYTES # BLD: 0.4 K/UL (ref 0–1)
MONOCYTES NFR BLD: 5 % (ref 5–13)
NEUTS SEG # BLD: 4.6 K/UL (ref 1.8–8)
NEUTS SEG NFR BLD: 63 % (ref 32–75)
NRBC # BLD: 0 K/UL (ref 0–0.01)
NRBC BLD-RTO: 0 PER 100 WBC
PLATELET # BLD AUTO: 234 K/UL (ref 150–400)
PMV BLD AUTO: 10.8 FL (ref 8.9–12.9)
POTASSIUM SERPL-SCNC: 4 MMOL/L (ref 3.5–5.1)
PROT SERPL-MCNC: 8.3 G/DL (ref 6.4–8.2)
RBC # BLD AUTO: 4.81 M/UL (ref 4.1–5.7)
SODIUM SERPL-SCNC: 140 MMOL/L (ref 136–145)
TROPONIN I SERPL HS-MCNC: 5 NG/L (ref 0–76)
WBC # BLD AUTO: 7.3 K/UL (ref 4.1–11.1)

## 2024-01-26 PROCEDURE — 84484 ASSAY OF TROPONIN QUANT: CPT

## 2024-01-26 PROCEDURE — 36415 COLL VENOUS BLD VENIPUNCTURE: CPT

## 2024-01-26 PROCEDURE — 80053 COMPREHEN METABOLIC PANEL: CPT

## 2024-01-26 PROCEDURE — 94760 N-INVAS EAR/PLS OXIMETRY 1: CPT

## 2024-01-26 PROCEDURE — 85379 FIBRIN DEGRADATION QUANT: CPT

## 2024-01-26 PROCEDURE — 1100000003 HC PRIVATE W/ TELEMETRY

## 2024-01-26 PROCEDURE — 99285 EMERGENCY DEPT VISIT HI MDM: CPT

## 2024-01-26 PROCEDURE — 85025 COMPLETE CBC W/AUTO DIFF WBC: CPT

## 2024-01-26 PROCEDURE — 74174 CTA ABD&PLVS W/CONTRAST: CPT

## 2024-01-26 PROCEDURE — 71045 X-RAY EXAM CHEST 1 VIEW: CPT

## 2024-01-26 PROCEDURE — 93005 ELECTROCARDIOGRAM TRACING: CPT | Performed by: STUDENT IN AN ORGANIZED HEALTH CARE EDUCATION/TRAINING PROGRAM

## 2024-01-26 PROCEDURE — 6360000004 HC RX CONTRAST MEDICATION: Performed by: PHYSICIAN ASSISTANT

## 2024-01-26 PROCEDURE — 6370000000 HC RX 637 (ALT 250 FOR IP): Performed by: PHYSICIAN ASSISTANT

## 2024-01-26 RX ORDER — ASPIRIN 325 MG
325 TABLET ORAL
Status: COMPLETED | OUTPATIENT
Start: 2024-01-26 | End: 2024-01-26

## 2024-01-26 RX ADMIN — IOPAMIDOL 100 ML: 755 INJECTION, SOLUTION INTRAVENOUS at 17:19

## 2024-01-26 RX ADMIN — ASPIRIN 325 MG: 325 TABLET ORAL at 13:48

## 2024-01-26 ASSESSMENT — PAIN - FUNCTIONAL ASSESSMENT: PAIN_FUNCTIONAL_ASSESSMENT: NONE - DENIES PAIN

## 2024-01-26 NOTE — H&P
Hospitalist Admission Note    NAME:   Jorge L Esposito   : 1980   MRN: [id number]     Date/Time: 2024 4:06 PM    Patient PCP: No primary care provider on file.    ______________________________________________________________________  Given the patient's current clinical presentation, I have a high level of concern for decompensation if discharged from the emergency department.  Complex decision making was performed, which includes reviewing the patient's available past medical records, laboratory results, and x-ray films.       My assessment of this patient's clinical condition and my plan of care is as follows.    Assessment / Plan:    Chest pain several weeks POA  Etiology unclear  No prior CAD or MI or cardiac issues  Pressure sensation substernal, radiates to his left shoulder and arm   Also similar sensation in his abdomen past few days   Not all episodes are exertional, sometimes associated with meals  Recent A1c 5.8, recent   Scheduled to see Dr. Lombardo at [place] [facility] on Tuesday  Seen in emergency room 2024   EKG and cardiac enzymes were negative   Chest x-ray unremarkable   Treated with Carafate and omeprazole but he did not start these until today  He is anxious about the pain, has been reading up online   When the pain recurred today he decided come back to the emergency room  Currently chest pain-free when I saw  EKG with no ischemic changes  Troponin negative  D-dimer negative  We were called admit the patient, but the patient does not want to stay for a variety of reasons   He prefers no admission  I spoke with the ER team  Recent doxycycline course for swollen lymph nodes, questionable esophageal process   But no odynophagia  Recently started on Biktarvy for HIV approximately a month ago   Had some swollen lymph nodes in the submandibular area, that is why he took the doxycycline   ? immune reconstitution reaction with increasing inflammation in  positive recently started on Biktarvy, CD4 cells 258 with a viral load of 1,030,000  Previously had been off antiretrovirals for several years  2.  Alcohol use disorder recently gone through rehab, no alcohol since 2023  3.  Prediabetes hemoglobin A1c 5.8  4. Hyperlipidemia .     Social History     Tobacco Use    Smoking status: Yes , 1 PPD    Smokeless tobacco: No   Substance Use Topics    Alcohol use: None since 2023        No Known Allergies     Prior to Admission medications    Medication Sig Start Date End Date Taking? Authorizing Provider   sucralfate (CARAFATE) 1 GM tablet Take 1 tablet by mouth 4 times daily 24  Júnior Gillis MD   omeprazole (PRILOSEC) 20 MG delayed release capsule Take 2 capsules by mouth Daily 24  Júnior Gillis MD   Biktarvy 1 tablet p.o. daily started approximately 1 month ago      Objective:   VITALS:    Patient Vitals for the past 24 hrs:   BP Temp Temp src Pulse Resp SpO2 Height Weight   24 1535 113/77 98.5 °F (36.9 °C) Oral 73 22 97 % -- --   24 1145 131/75 98.8 °F (37.1 °C) Oral 86 18 97 % 1.778 m (5' 10\") 108.9 kg (240 lb)       Temp (24hrs), Av.7 °F (37.1 °C), Min:98.5 °F (36.9 °C), Max:98.8 °F (37.1 °C)        O2 Device: None (Room air)    Wt Readings from Last 12 Encounters:   24 108.9 kg (240 lb)   24 102.1 kg (225 lb)         PHYSICAL EXAM:  General:    Alert, cooperative, appears stated age.     Lungs:   CTA b/l.  No wheezing or Rhonchi. No rales.  Chest wall:  No tenderness.  No accessory muscle use.  Heart:   Regular  rhythm,  No  Murmur.   No edema  Abdomen:   Soft, NT. ND  BS+  Extremities: No cyanosis.  No clubbing,      Skin turgor normal, Radial dial pulse 2+. Capillary refill normal  Neurologic: No facial asymmetry. No aphasia or slurred speech. Symmetrical strength, Sensation grossly intact. AAOx4.         LAB DATA REVIEWED:    Recent Results (from the past 12 hour(s))   EKG 12 Lead

## 2024-01-26 NOTE — DISCHARGE INSTRUCTIONS
We cannot force you to be admitted here today.  However do encourage you to return to the emergency department with worsening of symptoms.  Please follow-up closely with cardiology    It was noted on your CT scan of your pelvis you will have a bony lesion that will need to be followed.  Please contact your primary care physician in regards to this.

## 2024-01-26 NOTE — ED NOTES
RN spoke with pt regarding his admission status. Pt stated they would rather not be admitted if avoidable. Pt verbalized that he has a cardiology appointment next week, and asked if d/c was a possibility with cardio follow up. RN called PA at this time to inform them of pt interaction.

## 2024-01-26 NOTE — ED PROVIDER NOTES
[facility] EMERGENCY DEPT  EMERGENCY DEPARTMENT ENCOUNTER       Pt Name: Jorge L Esposito  MRN: [id number]  Birthdate 1980  Date of Evaluation: 1/26/2024  Provider: Annabella Mckeon PA-C   PCP: No primary care provider on file.  Note Started: 7:26 PM 1/26/24     CHIEF COMPLAINT       Chief Complaint   Patient presents with    Chest Pain     Pt arrives via EMS from home d/t intermittent dizziness and burning CP w/ radiation to LUE x 3 weeks. Pt seen Saturday at this facility and discharged with Prilosec and carafate, pt started meds today without relief. Pt also started new HIV med Biktarvy about 3 weeks a go when sx began          HISTORY OF PRESENT ILLNESS: 1 or more elements      History From: Patient  None     Jorge L Esposito is a 43 y.o. male who presents to the ED today with concerns about chest pain.  Substernal now moving off and left.  Also having some discomfort in his left upper extremity.  Seen here just a few days ago.  Had 2 troponin and discharge.  Patient states that he is having worsening of symptoms.  No diaphoresis.  Upper outpatient cardiology appointment coming up however is never had cardiac workup before.  Presents here for further evaluation     Nursing Notes were all reviewed and agreed with or any disagreements were addressed in the HPI.     REVIEW OF SYSTEMS      Review of Systems     Positives and Pertinent negatives as per HPI.    PAST HISTORY     Past Medical History:  No past medical history on file.    Past Surgical History:  No past surgical history on file.    Family History:  No family history on file.    Social History:       Allergies:  No Known Allergies    CURRENT MEDICATIONS      Previous Medications    OMEPRAZOLE (PRILOSEC) 20 MG DELAYED RELEASE CAPSULE    Take 2 capsules by mouth Daily    SUCRALFATE (CARAFATE) 1 GM TABLET    Take 1 tablet by mouth 4 times daily       PHYSICAL EXAM      Vitals:    01/26/24 1145 01/26/24 1535   BP: 131/75 113/77   Pulse: 86 73   Resp: 18 22

## 2024-01-27 NOTE — ED PROVIDER NOTES
EKG 12 Lead    Date/Time: 1/26/2024 9:00 PM    Performed by: Benji Reynolds MD  Authorized by: Mariya Fatima MD    ECG interpreted by ED Physician in the absence of a cardiologist: yes    Interpretation:     Interpretation: non-specific    Rate:     ECG rate assessment: normal    Rhythm:     Rhythm: sinus rhythm    Ectopy:     Ectopy: none            Benji Reynolds MD  01/26/24 2100

## 2024-02-03 NOTE — DISCHARGE SUMMARY
Hospitalist    Pt was not admitted    He declined our recommendation for admission via shared decision making with patient, ED and myself.    See my consult(falsely labeled as H+p) for details    Will follow up with cardiology this week, take baby aspirin daily till seen by cardiology    Caesar Haney Jr, MD